# Patient Record
Sex: MALE | Employment: UNEMPLOYED | URBAN - METROPOLITAN AREA
[De-identification: names, ages, dates, MRNs, and addresses within clinical notes are randomized per-mention and may not be internally consistent; named-entity substitution may affect disease eponyms.]

---

## 2019-07-24 ENCOUNTER — TELEPHONE (OUTPATIENT)
Dept: SLEEP MEDICINE | Facility: MEDICAL CENTER | Age: 49
End: 2019-07-24

## 2019-07-24 ENCOUNTER — SLEEP STUDY (OUTPATIENT)
Dept: SLEEP MEDICINE | Facility: MEDICAL CENTER | Age: 49
End: 2019-07-24
Attending: FAMILY MEDICINE
Payer: COMMERCIAL

## 2019-07-24 DIAGNOSIS — G47.33 OSA (OBSTRUCTIVE SLEEP APNEA): ICD-10-CM

## 2019-07-24 PROCEDURE — 95811 POLYSOM 6/>YRS CPAP 4/> PARM: CPT | Performed by: FAMILY MEDICINE

## 2019-07-25 NOTE — PROCEDURES
Technical summary: The patient underwent a split-night polysomnogram. This was a 16 channel montage study to include a 6 channel EEG, a 2 channel EOG, and chin EMG, left and right leg EMG, a snore channel, a nasal pressure transducer, and a nasal oral airflow  thermistor and a CFLOW pressure transducer.   Respiratory effort was assessed with the use of a thoracic and abdominal monitor and overnight oximetry was obtained. Audio and video recordings were reviewed. This was a fully attended study and sleep stage scoring was performed. The test was technically adequate.    Scoring Criteria: A modification of the the AASM Manual for the Scoring of Sleep and Associated Events, 2012, was used.   Obstructive apnea was scored by cessation of airflow for at least 10 seconds with continuing respiratory effort.  Central apnea was scored by cessation of airflow for at least 10 seconds with no effort.  Hypopnea was scored by a 30% or more reduction in airflow for at least 10 seconds accompanied by an arterial oxygen desaturation of 3% or more.  (For Medicare patients, hypopneas were scored by a 30% or more reduction in airflow for at least 10 seconds accompanied by an arterial oxygen saturation of 4% or more, as required by their insurance, CMS.    Interpretation:  Study start time was 07:55:34 PM.  Total recording time was 4h 11.5m (251 minutes) with a total sleep time of 2h 6.0m (126 minutes) resulting in a sleep efficiency of 50.10%.  Sleep latency from the start fo the study was 89 minutes minutes and REM latency from sleep onset was 56 minutes minutes. Sleep stages showed increased SL, decreased SE, decreased REM and increased WASO of 36 min.    Respiratory:   There were 27 apneas in total consisting of 24 obstructive apneas, 0 mixed apneas, and 3 central apneas.  There were 50 hypopneas in total.The apnea index was 12.86 per hour and the hypopnea index was 23.81 per hour.The overall AHI was 36.7, with a REM AHI of 75.65,  and a supine AHI of 36.67.    Limb Movements:  There were a total of 0 periodic leg movements, of which 0 were PLMS arousals.  This resulted in a PLMS index of 0.0 and a PLMS arousal index of 0.0    Oximetry:  The mean SaO2 was 93.0% for the diagnostic portion of the study, with a minimum SaO2 of 81.0%.    Treatment:    Interpretation:  Treatment recording time was 5h 44.0m (344 minutes) with a total sleep time of 4h 20.5m (260 minutes) resulting in a sleep efficiency of 75.7%.  Sleep latency from the start of treatment was 12 minutes minutes and REM latency from sleep onset was 0h 58.5m minutes.  The patient had 52 arousals in total for an arousal index of 12.0. Sleep stages showed decreased SE, increased REM and normal WASO.    Respiratory:   There were 17 apneas in total consisting of  4 obstructive apneas, 13 central apneas, and 0 mixed apneas for an apnea index of 3.92.  The patient had 21 hypopneas in total, which resulted in a hypopnea index of 4.84.  The overall AHI was 8.75, with a REM AHI of 14.55, and a supine AHI of 8.75.     34% of the apneas were central apneas.    Limb Movements:  There were a total of 4 periodic leg movements, of which 0 were PLMS arousals.  This resulted in a PLMS index of 0.9 and a PLMS arousal index of 0.0.    Oximetry:  The mean SaO2 during treatment was 95.0%, with a minimum oxygen saturation of 82.0%.    CPAP was tried from 4 cm H2O to 12 cm H2O.      CPAP Titration:  Due to the significant number of obstructive respiratory events observed during the diagnostic portion of the study a CPAP titration trial was performed during the second half of the night. The CPAP pressure was initiated at 4 cm of water and the pressure was increased in an attempt to eliminate all sleep disordered breathing and snoring. The CPAP pressure was increased to CPAP 12 cm water and at this final pressure the patient was observed in the supine position and in the REM sleep stage. The apnea hypopnea  index improved to 4.4/hr with improved O2 alison of  92%.    Impression:  1.  Severe obstructive sleep apnea with AHI of 36.7/hr and O2 alison 81 %. Due to severity of the disease he met the split study protocol. The titration started with CPAP 4 cm and the best tolerated was CPAP 12 cm. The AHI improved to 4.49/hr with improved O2 alison of 92% and average O2 saturation of 96 %.         Recommendations:  I recommend CPAP 12 cm with simplus mask. I also recommend 30 day compliance download to assess the efficacy to the recommended pressure, measure leak, apnea hypopnea index and compliance for further outpatient monitoring and management of CPAP therapy. In some cases alternative treatment options may prove effective in resolving sleep apnea and these options include upper airway surgery, the use of a dental orthotic or weight loss and positional therapy. Clinical correlation is required. In general patients with sleep apnea are advised to avoid alcohol and sedatives and to not operate a motor vehicle while drowsy and are at a greater risk for cardiovascular disease.

## 2024-06-27 ENCOUNTER — TELEMEDICINE2 (OUTPATIENT)
Dept: CARDIOLOGY | Facility: MEDICAL CENTER | Age: 54
End: 2024-06-27
Attending: INTERNAL MEDICINE
Payer: COMMERCIAL

## 2024-06-27 VITALS
SYSTOLIC BLOOD PRESSURE: 132 MMHG | RESPIRATION RATE: 16 BRPM | DIASTOLIC BLOOD PRESSURE: 79 MMHG | BODY MASS INDEX: 33.59 KG/M2 | HEIGHT: 67 IN | OXYGEN SATURATION: 95 % | WEIGHT: 214 LBS | HEART RATE: 93 BPM

## 2024-06-27 DIAGNOSIS — R07.2 PRECORDIAL PAIN: ICD-10-CM

## 2024-06-27 DIAGNOSIS — I10 ESSENTIAL HYPERTENSION, BENIGN: ICD-10-CM

## 2024-06-27 RX ORDER — LISINOPRIL 40 MG/1
40 TABLET ORAL DAILY
Qty: 100 TABLET | Refills: 3 | Status: SHIPPED | OUTPATIENT
Start: 2024-06-27

## 2024-08-20 ENCOUNTER — APPOINTMENT (OUTPATIENT)
Dept: RADIOLOGY | Facility: MEDICAL CENTER | Age: 54
End: 2024-08-20
Attending: FAMILY MEDICINE
Payer: COMMERCIAL